# Patient Record
Sex: FEMALE | Race: WHITE | NOT HISPANIC OR LATINO | Employment: FULL TIME | URBAN - METROPOLITAN AREA
[De-identification: names, ages, dates, MRNs, and addresses within clinical notes are randomized per-mention and may not be internally consistent; named-entity substitution may affect disease eponyms.]

---

## 2022-02-28 ENCOUNTER — APPOINTMENT (OUTPATIENT)
Dept: GENERAL RADIOLOGY | Facility: CLINIC | Age: 52
End: 2022-02-28
Attending: INTERNAL MEDICINE
Payer: COMMERCIAL

## 2022-02-28 ENCOUNTER — HOSPITAL ENCOUNTER (EMERGENCY)
Facility: CLINIC | Age: 52
Discharge: HOME | End: 2022-02-28
Attending: INTERNAL MEDICINE | Admitting: INTERNAL MEDICINE
Payer: COMMERCIAL

## 2022-02-28 VITALS
DIASTOLIC BLOOD PRESSURE: 74 MMHG | BODY MASS INDEX: 24.44 KG/M2 | WEIGHT: 165 LBS | HEIGHT: 69 IN | OXYGEN SATURATION: 100 % | SYSTOLIC BLOOD PRESSURE: 122 MMHG | TEMPERATURE: 98.1 F | HEART RATE: 77 BPM | RESPIRATION RATE: 16 BRPM

## 2022-02-28 DIAGNOSIS — S92.902A CLOSED FRACTURE OF LEFT FOOT, INITIAL ENCOUNTER: ICD-10-CM

## 2022-02-28 PROCEDURE — 99284 EMERGENCY DEPT VISIT MOD MDM: CPT | Mod: 25 | Performed by: INTERNAL MEDICINE

## 2022-02-28 PROCEDURE — 28450 TX TARSAL B1 FX W/O MNPJ EA: CPT | Mod: LT | Performed by: INTERNAL MEDICINE

## 2022-02-28 PROCEDURE — 250N000013 HC RX MED GY IP 250 OP 250 PS 637: Performed by: NURSE PRACTITIONER

## 2022-02-28 PROCEDURE — 73630 X-RAY EXAM OF FOOT: CPT | Mod: 26 | Performed by: RADIOLOGY

## 2022-02-28 PROCEDURE — 28450 TX TARSAL B1 FX W/O MNPJ EA: CPT | Mod: 54 | Performed by: INTERNAL MEDICINE

## 2022-02-28 PROCEDURE — 73630 X-RAY EXAM OF FOOT: CPT | Mod: LT

## 2022-02-28 RX ORDER — IBUPROFEN 600 MG/1
600 TABLET, FILM COATED ORAL ONCE
Status: COMPLETED | OUTPATIENT
Start: 2022-02-28 | End: 2022-02-28

## 2022-02-28 RX ORDER — ACETAMINOPHEN 500 MG
1000 TABLET ORAL ONCE
Status: COMPLETED | OUTPATIENT
Start: 2022-02-28 | End: 2022-02-28

## 2022-02-28 RX ADMIN — ACETAMINOPHEN 1000 MG: 500 TABLET, FILM COATED ORAL at 12:31

## 2022-02-28 RX ADMIN — IBUPROFEN 600 MG: 600 TABLET ORAL at 12:31

## 2022-02-28 ASSESSMENT — ENCOUNTER SYMPTOMS
FEVER: 0
SHORTNESS OF BREATH: 0
ABDOMINAL PAIN: 0

## 2022-02-28 NOTE — ED TRIAGE NOTES
Pro comes to the ED this morning for evaluation of LEFT foot injury.  She states that while walking this morning she twisted her foot and is now experiencing increased pain.

## 2022-02-28 NOTE — ED PROVIDER NOTES
[facility] ([facility])  2/28/22 Vertical Triage C 12:31 PM    History     Chief Complaint   Patient presents with     Foot Injury     The history is provided by the patient and medical records.     Pro Burnett is a 52 year old female who presents with left ankle pain and swelling after a fall today.  She was on her way to work, while walking here when at 6:15 AM she stepped on uneven pavement and fell forward.  She was able to break her fall and catch herself with her hands, no abrasions to her palms.  She notes that she had rolled her left ankle but figured she could walk it off.  She is able to bear weight on it at the time, did not note any significant pain.  However over the course of her workday she noticed increasing pain and swelling to her left lateral ankle. She is no longer able to bear weight on it due to pain. She works at [employer] and isn't able to work and push carts due to the pain.     Past Medical History  History reviewed. No pertinent past medical history.  History reviewed. No pertinent surgical history.  No current outpatient medications on file.    No Known Allergies  Family History  History reviewed. No pertinent family history.  Social History   Social History     Tobacco Use     Smoking status: Never Smoker     Smokeless tobacco: Never Used   Substance Use Topics     Alcohol use: Yes     Comment: occasionally     Drug use: Not Currently      Past medical history, past surgical history, medications, allergies, family history, and social history were reviewed with the patient. No additional pertinent items.       Review of Systems   Constitutional: Negative for fever.   Respiratory: Negative for shortness of breath.    Cardiovascular: Negative for chest pain.   Gastrointestinal: Negative for abdominal pain.   Musculoskeletal: Positive for gait problem.   All other systems reviewed and are negative.    A complete review of systems was performed with  "pertinent positives and negatives noted in the HPI, and all other systems negative.    Physical Exam   BP: 122/74  Pulse: 77  Temp: 98.1  F (36.7  C)  Resp: 16  Height: 175.3 cm (5' 9\")  Weight: 74.8 kg (165 lb)  SpO2: 100 %  Physical Exam  HENT:      Head: Atraumatic.   Eyes:      Pupils: Pupils are equal, round, and reactive to light.   Cardiovascular:      Rate and Rhythm: Regular rhythm.      Heart sounds: Normal heart sounds.   Pulmonary:      Effort: No respiratory distress.      Breath sounds: Normal breath sounds.   Chest:      Chest wall: No tenderness.   Abdominal:      Tenderness: There is no abdominal tenderness.   Musculoskeletal:      Cervical back: No tenderness.      Thoracic back: No tenderness.      Lumbar back: No tenderness.      Right foot: Normal.      Left foot: Decreased range of motion. Normal capillary refill. Swelling and tenderness present. No deformity, bunion, Charcot foot, foot drop, prominent metatarsal heads, laceration, bony tenderness or crepitus. Normal pulse.        Legs:    Neurological:      Mental Status: She is oriented to person, place, and time.         ED Course      Procedures                     Results for orders placed or performed during the hospital encounter of 02/28/22   Foot XR, G/E 3 views, left     Status: None    Narrative    3 views left foot radiographs 2/28/2022 1:25 PM    History: pain, injury    Comparison: None available    Findings:    Standing AP, oblique, and lateral  views of the left foot were  obtained.     There is very subtle cortical irregularity and lucency involving the  lateral contour of the cuboid, proximally and distally.      Lisfranc articulation alignment is congruent on this non-weight  bearing images.    Mild degenerative changes of first metatarsophalangeal joint.   Achilles tendon insertional and plantar calcaneal enthesopathy.     Soft tissue swelling, lateral foot.      Impression    Impression:  1. Subtle cortical irregularity and " lucency involving the lateral  contour of the cuboid, proximally and distally. A subtle fracture  cannot be excluded. Superficial to the cuboid there is soft tissue  swelling.  2. No substantial degenerative change.    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       I have reviewed the nursing notes. I have reviewed the findings, diagnosis, plan and need for follow up with the patient.    There are no discharge medications for this patient.      Final diagnoses:   Closed fracture of left foot, initial encounter     I, Rachel Cyr, am serving as a trained medical scribe to document services personally performed by Aldo Li MD based on the provider's statements to me on March 20, 2018.  This document has been checked and approved by the attending provider.    I, Aldo Li MD, was physically present and have reviewed and verified the accuracy of this note documented by Rachel Cyr, medical scribe.     --  Aldo Li MD  [facility] EMERGENCY DEPARTMENT  2/28/2022     Aldo Li MD  02/28/22 3410

## 2022-02-28 NOTE — ED TRIAGE NOTES
"ED Triage Provider Note  [facility]  Encounter Date: Feb 28, 2022    History:  Chief Complaint   Patient presents with     Foot Injury     Pro Burnett is a 52 year old female who presents to the ED with c/o left ankle pain after tripping on an uneven surface on her way into work this morning. She fell onto her outstretched hands, did not hit her head. Denies any other pain other than to left ankle.     Review of Systems:  HTN    Exam:  /74   Pulse 77   Temp 98.1  F (36.7  C) (Oral)   Resp 16   Ht 1.753 m (5' 9\")   Wt 74.8 kg (165 lb)   SpO2 100%   Breastfeeding No   BMI 24.37 kg/m    General: No acute distress. Appears stated age.   Cardio: Regular rate, extremities well perfused  Resp: Normal work of breathing, grossly normal respiratory rate  Neuro: Alert. CN II-XII grossly intact. Grossly intact strength.   Musc: left ankle edema, pain. Limited ROM left ankle.     Medical Decision Making:  Patient arriving to the ED with problem as above. A medical screening exam was performed. Xray, pain medication orders initiated from Triage. The patient is appropriate to wait in triage.      MARK Rose CNP on 2/28/2022 at 12:29 PM    "

## 2022-02-28 NOTE — DISCHARGE INSTRUCTIONS
Please make an appointment to follow up with Your Ortho or[facility]thopedics Clinic (phone: [phone]) in 5-10 days even if entirely better.

## 2022-03-01 ENCOUNTER — TELEPHONE (OUTPATIENT)
Dept: ORTHOPEDICS | Facility: CLINIC | Age: 52
End: 2022-03-01
Payer: COMMERCIAL

## 2022-03-01 ENCOUNTER — DOCUMENTATION ONLY (OUTPATIENT)
Dept: ORTHOPEDICS | Facility: CLINIC | Age: 52
End: 2022-03-01
Payer: COMMERCIAL

## 2022-03-01 NOTE — PROGRESS NOTES
Orthopedic/Sports Medicine Fracture Triage    Incoming call/or message from orders pager.    Fracture type: Foot.    The patient is in a  splint. CAM boot     Date of injury 2/28/22.    Triaged by: Dr. Woodard.    Determined to be managed Non operatively.    Needs to be seen within 1 week.    Additional Comments/information: Message sent to clinic coordinators to schedule    Jackie Scott ATC

## 2022-03-01 NOTE — TELEPHONE ENCOUNTER
----- Message from Jose Costello, ATC sent at 3/1/2022  4:05 PM CST -----    ----- Message -----  From: Jackie Scott, ATC  Sent: 3/1/2022   3:33 PM CST  To: Cordelia Morgan, #    Please call to schedule left cuboid fracture with non op sports MD per Dr. Woodard. Thanks!